# Patient Record
Sex: MALE | Race: WHITE | NOT HISPANIC OR LATINO | URBAN - METROPOLITAN AREA
[De-identification: names, ages, dates, MRNs, and addresses within clinical notes are randomized per-mention and may not be internally consistent; named-entity substitution may affect disease eponyms.]

---

## 2018-01-27 ENCOUNTER — HOSPITAL ENCOUNTER (EMERGENCY)
Facility: OTHER | Age: 43
Discharge: HOME | End: 2018-01-27
Attending: EMERGENCY MEDICINE

## 2018-01-27 VITALS
TEMPERATURE: 99 F | SYSTOLIC BLOOD PRESSURE: 138 MMHG | RESPIRATION RATE: 17 BRPM | OXYGEN SATURATION: 97 % | WEIGHT: 170 LBS | DIASTOLIC BLOOD PRESSURE: 91 MMHG | HEART RATE: 68 BPM | HEIGHT: 70 IN | BODY MASS INDEX: 24.34 KG/M2

## 2018-01-27 DIAGNOSIS — N20.0 RIGHT KIDNEY STONE: ICD-10-CM

## 2018-01-27 DIAGNOSIS — N13.4 HYDROURETER ON LEFT: Primary | ICD-10-CM

## 2018-01-27 LAB
ALBUMIN SERPL-MCNC: 4.2 G/DL (ref 3.3–5.5)
ALP SERPL-CCNC: 46 U/L (ref 42–141)
BILIRUB SERPL-MCNC: 1 MG/DL (ref 0.2–1.6)
BILIRUBIN, POC UA: NEGATIVE
BLOOD, POC UA: ABNORMAL
BUN SERPL-MCNC: 11 MG/DL (ref 7–22)
CALCIUM SERPL-MCNC: 9.4 MG/DL (ref 8–10.3)
CHLORIDE SERPL-SCNC: 102 MMOL/L (ref 98–108)
CLARITY, POC UA: CLEAR
COLOR, POC UA: YELLOW
CREAT SERPL-MCNC: 1.3 MG/DL (ref 0.6–1.2)
GLUCOSE SERPL-MCNC: 97 MG/DL (ref 73–118)
GLUCOSE, POC UA: NEGATIVE
KETONES, POC UA: NEGATIVE
LEUKOCYTE EST, POC UA: NEGATIVE
NITRITE, POC UA: NEGATIVE
PH UR STRIP: 7 [PH]
POC ALT (SGPT): 16 U/L (ref 10–47)
POC AST (SGOT): 30 U/L (ref 11–38)
POC TCO2: 28 MMOL/L (ref 18–33)
POTASSIUM BLD-SCNC: 3.5 MMOL/L (ref 3.6–5.1)
PROTEIN, POC UA: NEGATIVE
PROTEIN, POC: 7 G/DL (ref 6.4–8.1)
SODIUM BLD-SCNC: 140 MMOL/L (ref 128–145)
SPECIFIC GRAVITY, POC UA: 1.01
UROBILINOGEN, POC UA: 0.2 E.U./DL

## 2018-01-27 PROCEDURE — 99284 EMERGENCY DEPT VISIT MOD MDM: CPT | Mod: 25

## 2018-01-27 PROCEDURE — 85025 COMPLETE CBC W/AUTO DIFF WBC: CPT

## 2018-01-27 PROCEDURE — 25500020 PHARM REV CODE 255: Performed by: EMERGENCY MEDICINE

## 2018-01-27 PROCEDURE — 25000003 PHARM REV CODE 250: Performed by: NURSE PRACTITIONER

## 2018-01-27 PROCEDURE — 80053 COMPREHEN METABOLIC PANEL: CPT

## 2018-01-27 PROCEDURE — 96361 HYDRATE IV INFUSION ADD-ON: CPT

## 2018-01-27 PROCEDURE — 96374 THER/PROPH/DIAG INJ IV PUSH: CPT

## 2018-01-27 PROCEDURE — 63600175 PHARM REV CODE 636 W HCPCS: Performed by: NURSE PRACTITIONER

## 2018-01-27 PROCEDURE — 81003 URINALYSIS AUTO W/O SCOPE: CPT

## 2018-01-27 RX ORDER — ACETAMINOPHEN AND CODEINE PHOSPHATE 300; 30 MG/1; MG/1
1 TABLET ORAL EVERY 8 HOURS PRN
Qty: 15 TABLET | Refills: 0 | Status: SHIPPED | OUTPATIENT
Start: 2018-01-27

## 2018-01-27 RX ORDER — KETOROLAC TROMETHAMINE 30 MG/ML
15 INJECTION, SOLUTION INTRAMUSCULAR; INTRAVENOUS
Status: COMPLETED | OUTPATIENT
Start: 2018-01-27 | End: 2018-01-27

## 2018-01-27 RX ADMIN — IOHEXOL 75 ML: 350 INJECTION, SOLUTION INTRAVENOUS at 03:01

## 2018-01-27 RX ADMIN — KETOROLAC TROMETHAMINE 15 MG: 30 INJECTION, SOLUTION INTRAMUSCULAR at 03:01

## 2018-01-27 RX ADMIN — SODIUM CHLORIDE 1000 ML: 0.9 INJECTION, SOLUTION INTRAVENOUS at 05:01

## 2018-01-27 NOTE — ED PROVIDER NOTES
"Encounter Date: 1/27/2018       History     Chief Complaint   Patient presents with    Flank Pain     Pt states, " Abdominal pain yesterday, today right flank pain."     The history is provided by the patient.   Abdominal Pain   The current episode started yesterday. The onset of the illness was gradual. The problem has been gradually improving. The abdominal pain is located in the RLQ. The abdominal pain radiates to the right flank. The severity of the abdominal pain is 3/10. The abdominal pain is relieved by being still. The abdominal pain is exacerbated by certain positions. The other symptoms of the illness do not include fever, fatigue, jaundice, melena, nausea, vomiting, diarrhea, dysuria, hematemesis, hematochezia, vaginal discharge or vaginal bleeding.   The patient states that she believes she is currently not pregnant. The patient has not had a change in bowel habit.     Review of patient's allergies indicates:  No Known Allergies  History reviewed. No pertinent past medical history.  History reviewed. No pertinent surgical history.  History reviewed. No pertinent family history.  Social History   Substance Use Topics    Smoking status: Never Smoker    Smokeless tobacco: Never Used    Alcohol use No     Review of Systems   Constitutional: Negative.  Negative for fatigue and fever.   HENT: Negative.    Eyes: Negative.    Respiratory: Negative.    Cardiovascular: Negative.    Gastrointestinal: Positive for abdominal pain. Negative for diarrhea, hematemesis, hematochezia, jaundice, melena, nausea and vomiting.   Genitourinary: Positive for flank pain (right). Negative for dysuria, vaginal bleeding and vaginal discharge.   Skin: Negative.    Allergic/Immunologic: Negative.    Neurological: Negative.    Psychiatric/Behavioral: Negative.    All other systems reviewed and are negative.      Physical Exam     Initial Vitals [01/27/18 1415]   BP Pulse Resp Temp SpO2   (!) 156/108 72 16 98.7 °F (37.1 °C) 99 %    "   MAP       124         Physical Exam    Nursing note and vitals reviewed.  Constitutional: He appears well-developed and well-nourished. He is not diaphoretic.  Non-toxic appearance. He does not appear ill. No distress.   HENT:   Head: Normocephalic and atraumatic.   Eyes: Conjunctivae are normal.   Neck: Normal range of motion.   Cardiovascular: Normal rate, regular rhythm, normal heart sounds and intact distal pulses. Exam reveals no gallop and no friction rub.    No murmur heard.  Pulmonary/Chest: Breath sounds normal. No respiratory distress. He has no wheezes. He has no rhonchi. He has no rales. He exhibits no tenderness.   Abdominal: Soft. Normal appearance and bowel sounds are normal. He exhibits no distension and no mass. There is no hepatosplenomegaly. There is tenderness in the right lower quadrant. There is no rigidity, no rebound, no guarding, no CVA tenderness, no tenderness at McBurney's point and negative Bermudez's sign. No hernia.   Musculoskeletal: Normal range of motion.   Neurological: He is alert and oriented to person, place, and time.   Skin: Skin is warm and dry. No rash noted.   Psychiatric: He has a normal mood and affect. His behavior is normal. Judgment and thought content normal.       Imaging Results          CT Abdomen Pelvis With Contrast (Final result)  Result time 01/27/18 16:27:47    Final result by Devang Gupta DO (01/27/18 16:27:47)                 Impression:        1. Mild/moderate right-sided hydronephrosis and hydroureter secondary to a 3 mm obstructing stone within the distal left ureter located approximately 1 cm above the uterovesical junction.          Electronically signed by: DEVANG GUPTA D.O.  Date:     01/27/18  Time:    16:27              Narrative:    CT of the abdomen and pelvis with contrast.      Comparison: None    Technique:CT of the abdomen and pelvis was acquired helically from the lung bases through the ischial tuberosities status post administration  of 75 cc of Omnipaque 350. No oral contrast was administered. Axial and reformatted images were reviewed.     Findings:    ABDOMEN    Lung bases:Tiny less than 4 mm nodule noted within the right lung base adjacent to the right hemidiaphragm.    Liver/gallbladder/biliary:There is a cyst noted within the lateral segment of the left hepatic lobe that measures up to 8 mm.  Additional cysts noted within the right hepatic lobe that measures up to 1 cm.  Tiny cyst also seen adjacent to the gallbladder fossa within the left hepatic lobe measuring 5 mm. The gallbladder is present and unremarkable.No biliary ductal dilation.    Pancreas:The pancreas is unremarkable in appearance.    Spleen:The spleen is not enlarged.    Adrenals:Unremarkable    Kidneys:The kidneys are equally perfused and demonstrate no solid masses. There is mild right-sided hydronephrosis and hydroureter with periureteric stranding noted on the right secondary to an obstructing stone in the distal right ureter that measures approximately 3 mm.    Bowel/Mesentery:There is no evidence of bowel obstruction.  No mesenteric stranding or adenopathy.    Retroperitoneum:No adenopathy.The aorta demonstrates a normal caliber.    PELVIS:    Genitourinary/Reproductive organs:Unremarkable    Adenopathy:None    Free Fluid:No free fluid    Osseus Structures/Soft tissues:No suspicious appearing osseus lesions. No significant soft tissue abnormality.                              ED Course   Procedures  Labs Reviewed   POCT URINALYSIS W/O SCOPE - Abnormal; Notable for the following:        Result Value    Glucose, UA Negative (*)     Bilirubin, UA Negative (*)     Ketones, UA Negative (*)     Blood, UA 3+ (*)     Protein, UA Negative (*)     Nitrite, UA Negative (*)     Leukocytes, UA Negative (*)     All other components within normal limits   POCT CMP - Abnormal; Notable for the following:     POC Creatinine 1.3 (*)     POC Potassium 3.5 (*)     All other components within  normal limits   POCT URINALYSIS W/O SCOPE   POCT CBC   POCT CMP             Medical Decision Making:   Initial Assessment:   Kidney stone  Differential Diagnosis:   UTI, appendicitis  Clinical Tests:   Lab Tests: Ordered and Reviewed  The following lab test(s) were unremarkable: CBC and CMP  Radiological Study: Ordered and Reviewed  ED Management:  Toradol IV given to the patient in the ER.  Status post CT scan, the patient was given 1 L normal saline bolus.  The patient will be discharged home on Tylenol 3 with instructions to drink 6-8 glasses of water daily, reduce the amount appears that he drinks daily, follow up with his primary care provider in 2 days and return to the ER as needed if symptoms worsen or fail to improve.  The patient verbalized understanding of discharge instructions and treatment plan.              Attending Attestation:     Physician Attestation Statement for NP/PA:   I discussed this assessment and plan of this patient with the NP/PA, but I did not personally examine the patient. The face to face encounter was performed by the NP/PA.                  ED Course      Clinical Impression:   The primary encounter diagnosis was Hydroureter on left. A diagnosis of Right kidney stone was also pertinent to this visit.                           Toussaint Battley III, INOCENCIA  01/27/18 [id number]       Kath Capellan MD  01/27/18 [id number]

## 2018-01-27 NOTE — ED NOTES
"Pt advises that he "normally drinks" every day, and has not had a drink in 4-5 days because "I am taking a break"    "

## 2018-01-27 NOTE — ED TRIAGE NOTES
Abdominal pain, reports lower abdominal pains yesterday, now moving to right flank today, also reports no BM in 3-4 days